# Patient Record
Sex: FEMALE | Race: BLACK OR AFRICAN AMERICAN | NOT HISPANIC OR LATINO | URBAN - METROPOLITAN AREA
[De-identification: names, ages, dates, MRNs, and addresses within clinical notes are randomized per-mention and may not be internally consistent; named-entity substitution may affect disease eponyms.]

---

## 2018-12-06 ENCOUNTER — OUTPATIENT (OUTPATIENT)
Dept: OUTPATIENT SERVICES | Facility: HOSPITAL | Age: 58
LOS: 1 days | End: 2018-12-06
Payer: COMMERCIAL

## 2018-12-06 ENCOUNTER — APPOINTMENT (OUTPATIENT)
Dept: MAMMOGRAPHY | Facility: IMAGING CENTER | Age: 58
End: 2018-12-06
Payer: COMMERCIAL

## 2018-12-06 ENCOUNTER — APPOINTMENT (OUTPATIENT)
Dept: ULTRASOUND IMAGING | Facility: IMAGING CENTER | Age: 58
End: 2018-12-06
Payer: COMMERCIAL

## 2018-12-06 DIAGNOSIS — Z00.00 ENCOUNTER FOR GENERAL ADULT MEDICAL EXAMINATION WITHOUT ABNORMAL FINDINGS: ICD-10-CM

## 2018-12-06 PROCEDURE — 76641 ULTRASOUND BREAST COMPLETE: CPT

## 2018-12-06 PROCEDURE — G0279: CPT | Mod: 26

## 2018-12-06 PROCEDURE — G0279: CPT

## 2018-12-06 PROCEDURE — 76641 ULTRASOUND BREAST COMPLETE: CPT | Mod: 26,50

## 2018-12-06 PROCEDURE — 77066 DX MAMMO INCL CAD BI: CPT | Mod: 26

## 2018-12-06 PROCEDURE — 77066 DX MAMMO INCL CAD BI: CPT

## 2018-12-17 ENCOUNTER — OUTPATIENT (OUTPATIENT)
Dept: OUTPATIENT SERVICES | Facility: HOSPITAL | Age: 58
LOS: 1 days | End: 2018-12-17
Payer: COMMERCIAL

## 2018-12-17 ENCOUNTER — APPOINTMENT (OUTPATIENT)
Dept: ULTRASOUND IMAGING | Facility: IMAGING CENTER | Age: 58
End: 2018-12-17
Payer: COMMERCIAL

## 2018-12-17 ENCOUNTER — RESULT REVIEW (OUTPATIENT)
Age: 58
End: 2018-12-17

## 2018-12-17 DIAGNOSIS — N60.09 SOLITARY CYST OF UNSPECIFIED BREAST: ICD-10-CM

## 2018-12-17 PROCEDURE — 19000 PUNCTURE ASPIR CYST BREAST: CPT | Mod: RT

## 2018-12-17 PROCEDURE — 76942 ECHO GUIDE FOR BIOPSY: CPT | Mod: 26

## 2018-12-17 PROCEDURE — 76942 ECHO GUIDE FOR BIOPSY: CPT

## 2018-12-17 PROCEDURE — 88173 CYTOPATH EVAL FNA REPORT: CPT | Mod: 26

## 2018-12-17 PROCEDURE — 88173 CYTOPATH EVAL FNA REPORT: CPT

## 2018-12-17 PROCEDURE — 19000 PUNCTURE ASPIR CYST BREAST: CPT

## 2018-12-18 LAB — NON-GYNECOLOGICAL CYTOLOGY STUDY: SIGNIFICANT CHANGE UP

## 2019-12-04 NOTE — EMERGENCY DEPARTMENT REPORT
ED Abdominal Pain HPI





- General


Chief Complaint: Abdominal Pain


Stated Complaint: ABD PAIN


Source: patient


Mode of arrival: Ambulatory


Limitations: No Limitations





- History of Present Illness


Initial Comments: 





Patient is a 59-year-old  female with a history of chronic liver

disease who presents to the ED with low back pain and lower abdominal pain for 2

weeks.  Patient states that she has been diagnosed with constipation but has 

never bought any medication prescribed.  Patient denies fever, chills, dysuria, 

urinary frequency and urgency, heavy lifting, dizziness, chest pain, shortness 

of breath, nausea and vomiting or diarrhea fall or traumatic injury, numbness 

and tingling or weakness of lower extremities bilaterally and hematuria.


MD Complaint: abdominal pain, other (lower back pain)


-: Gradual, week(s) (2)


Location: suprapubic


Radiation: suprapubic


Migration to: no migration


Severity: moderate


Severity scale (0 -10): 4


Quality: aching, dull


Consistency: constant


Improves With: nothing


Worsens With: movement


Associated Symptoms: denies other symptoms, constipation.  denies: nausea, 

vomiting, diarrhea, fever, chills, dysuria, hematemesis, hematochezia, melena





- Related Data


                                    Allergies











Allergy/AdvReac Type Severity Reaction Status Date / Time


 


peanut Allergy  Itching Verified 12/04/19 00:57














ED Review of Systems


ROS: 


Stated complaint: ABD PAIN


Other details as noted in HPI





Constitutional: denies: chills, fever


Eyes: denies: eye pain, eye discharge, vision change


ENT: denies: ear pain, throat pain


Respiratory: denies: cough, shortness of breath, wheezing


Cardiovascular: denies: chest pain, palpitations


Endocrine: no symptoms reported


Gastrointestinal: abdominal pain.  denies: nausea, vomiting, diarrhea


Genitourinary: denies: urgency, dysuria, discharge


Musculoskeletal: back pain.  denies: joint swelling, arthralgia


Skin: denies: rash, lesions


Neurological: denies: headache, weakness, paresthesias


Psychiatric: denies: anxiety, depression


Hematological/Lymphatic: denies: easy bleeding, easy bruising





ED Past Medical Hx





- Past Medical History


Previous Medical History?: No





- Surgical History


Past Surgical History?: No





- Social History


Smoking Status: Current Every Day Smoker


Substance Use Type: Alcohol





ED Physical Exam





- General


Limitations: No Limitations


General appearance: alert, in no apparent distress





- Head


Head exam: Present: atraumatic, normocephalic





- Eye


Eye exam: Present: normal appearance, PERRL, EOMI





- ENT


ENT exam: Present: normal exam, normal orophraynx, mucous membranes moist, TM's 

normal bilaterally, normal external ear exam





- Neck


Neck exam: Present: normal inspection, full ROM.  Absent: tenderness





- Respiratory


Respiratory exam: Present: normal lung sounds bilaterally.  Absent: respiratory 

distress, wheezes, rales, chest wall tenderness, accessory muscle use, decreased

breath sounds





- Cardiovascular


Cardiovascular Exam: Present: regular rate, normal rhythm, normal heart sounds. 

Absent: systolic murmur, diastolic murmur, rubs, gallop





- GI/Abdominal


GI/Abdominal exam: Present: soft, normal bowel sounds.  Absent: distended, 

tenderness, guarding, hyperactive bowel sounds, hypoactive bowel sounds, 

organomegaly





- Extremities Exam


Extremities exam: Present: normal inspection, full ROM, normal capillary refill





- Back Exam


Back exam: Present: normal inspection, full ROM.  Absent: tenderness, muscle 

spasm, paraspinal tenderness





- Neurological Exam


Neurological exam: Present: alert, oriented X3, CN II-XII intact, normal gait, 

reflexes normal





- Psychiatric


Psychiatric exam: Present: normal affect, normal mood





- Skin


Skin exam: Present: warm, dry, intact, normal color.  Absent: rash





ED Course





                                   Vital Signs











  12/04/19





  00:58


 


Temperature 97.7 F


 


Pulse Rate 67


 


Respiratory 18





Rate 


 


Blood Pressure 139/72


 


O2 Sat by Pulse 97





Oximetry 














ED Medical Decision Making





- Lab Data


Result diagrams: 


                                 12/04/19 01:26





                                 12/04/19 01:26





- Medical Decision Making





This is a 59-year-old female who presented to the ED with lower back pain and 

lower abdominal pain for 2 weeks.  In the ED, patient is alert and oriented 3 

and is not in distress.  Lab test results were reviewed and on actionable.  

Patient fell asleep in the ED and slept throughout her ED stay no complaining of

any pain just complaining that it was cold.  Patient was discharged home and 

advised to follow-up with her primary care physician in 5-7 days for 

reevaluation or return to the ED immediately if symptoms get worse.





- Differential Diagnosis


abdominal pain; constipation; muscle spasm; UTI


Critical care attestation.: 


If time is entered above; I have spent that time in minutes in the direct care 

of this critically ill patient, excluding procedure time.








ED Disposition


Clinical Impression: 


 Spasm of muscle of lower back, Abdominal pain in female





Disposition: DC-01 TO HOME OR SELFCARE


Is pt being admited?: No


Does the pt Need Aspirin: No


Condition: Stable


Instructions:  Abdominal Pain (ED), Muscle Spasm (ED), Low Back Strain (ED)


Additional Instructions: 


Follow-up with your primary care physician in 5-7 days for reevaluation.  Return

to the ED immediately if symptoms get worse


Referrals: 


PRIMARY CARE,MD [Primary Care Provider] - 3-5 Days


Time of Disposition: 05:16


Print Language: ENGLISH

## 2020-03-26 ENCOUNTER — APPOINTMENT (OUTPATIENT)
Dept: ULTRASOUND IMAGING | Facility: IMAGING CENTER | Age: 60
End: 2020-03-26

## 2020-03-26 ENCOUNTER — APPOINTMENT (OUTPATIENT)
Dept: RADIOLOGY | Facility: IMAGING CENTER | Age: 60
End: 2020-03-26

## 2020-03-26 ENCOUNTER — APPOINTMENT (OUTPATIENT)
Dept: MAMMOGRAPHY | Facility: IMAGING CENTER | Age: 60
End: 2020-03-26

## 2021-01-25 ENCOUNTER — OUTPATIENT (OUTPATIENT)
Dept: OUTPATIENT SERVICES | Facility: HOSPITAL | Age: 61
LOS: 1 days | End: 2021-01-25
Payer: COMMERCIAL

## 2021-01-25 DIAGNOSIS — Z20.828 CONTACT WITH AND (SUSPECTED) EXPOSURE TO OTHER VIRAL COMMUNICABLE DISEASES: ICD-10-CM

## 2021-01-25 PROCEDURE — U0005: CPT

## 2021-01-25 PROCEDURE — U0003: CPT

## 2021-01-26 ENCOUNTER — TRANSCRIPTION ENCOUNTER (OUTPATIENT)
Age: 61
End: 2021-01-26

## 2021-01-26 LAB — SARS-COV-2 RNA SPEC QL NAA+PROBE: SIGNIFICANT CHANGE UP

## 2021-01-26 NOTE — ASU PATIENT PROFILE, ADULT - VISION (WITH CORRECTIVE LENSES IF THE PATIENT USUALLY WEARS THEM):
Right eye distorted/Partially impaired: cannot see medication labels or newsprint, but can see obstacles in path, and the surrounding layout; can count fingers at arm's length

## 2021-01-26 NOTE — ASU PATIENT PROFILE, ADULT - HEALTH/HEALTHCARE ANXIETIES, PROFILE
Check your blood sugar 2 times a day:  Before breakfast and 2 hours after a meal, blood sugar should be less than 150 on average.  Please bring the readings with you to next visit.    Please remember to have an eye exam at least once every year and as needed.    Please remember to have any scheduled blood work a few days prior to your appointment.    FOR TREATMENT OF HYPOGLYCEMIA    · When you feel symptoms like dizziness, blurry vision, cold sweat:    1. Check blood sugar at the time you feel symptoms.    2. Take 15 - 30 grams of carbohydrates (4 - 5 glucose tablets or glucose gel, 8 ounces regular soda, milk or juice, 5 - 6 Lifesavers).    3. Retest glucose in 15 minutes.    4. Repeat above steps if glucose still under 70.    · To prevent hypoglycemia and complications:    1. Always eat when taking your short-acting insulin (Novolog, Humalog, etc.) and don't skip meals if you take oral hypoglycemic medicines (Glyburide, Glipizide, Glimepiride)    2. Check your blood sugar each time before you drive, and don't drive if you are less than 80.    3. Carry a sugar source (glucose tablets/gel, hard candy) with you at all times.    · Always carry medical identification that identifies you as being hypoglycemic.          CARING FOR YOUR FEET    Since you have diabetes you need to take special care of your feet.  Poor circulation, nerve damage, and trouble fighting infections can make foot problems very serious.  These simple guidelines will help prevent complications.    Wash your feet every day in warm - not hot - water and dry thoroughly.  Trim nails straight across with a nail clippers after washing and drying feet. Then, smooth the nail with an emery board.  If you have trouble cutting nails because they are thick or you cannot see them, have nails trimmed by a foot doctor.  Trim corns and calluses gently with a pumice stone.  Never use liquid corn and callus remover.  Do not cut them with a razor blade.  If you get a  wart, do not treat it yourself.  See your doctor.  Always wear hard soled shoe or slippers.  Keep slippers by your bed in case you get up during the night.  Check inside your shoes before putting them on to make certain there is nothing in the shoe.  Wear clean socks every day.  Choose padded socks.  Look for ones with no seams, or smooth flat seams.  Do not use heating pads or hot water bottles on your feet.  Shop for shoes in the evening when your feet are normally a little swollen.  Use a foot cream on your feet after each bath or shower.  Do not put lotion between your toes as this area is already moist.  Prop your feet up when you are sitting.  Don't cross your legs for long periods of time.  Don't smoke.  Smoking contributes to poor circulation.        none

## 2021-01-27 ENCOUNTER — OUTPATIENT (OUTPATIENT)
Dept: OUTPATIENT SERVICES | Facility: HOSPITAL | Age: 61
LOS: 1 days | End: 2021-01-27
Payer: COMMERCIAL

## 2021-01-27 VITALS
SYSTOLIC BLOOD PRESSURE: 133 MMHG | HEART RATE: 85 BPM | OXYGEN SATURATION: 100 % | WEIGHT: 128.31 LBS | TEMPERATURE: 99 F | DIASTOLIC BLOOD PRESSURE: 82 MMHG | RESPIRATION RATE: 20 BRPM | HEIGHT: 62 IN

## 2021-01-27 VITALS
OXYGEN SATURATION: 100 % | DIASTOLIC BLOOD PRESSURE: 75 MMHG | RESPIRATION RATE: 12 BRPM | HEART RATE: 60 BPM | SYSTOLIC BLOOD PRESSURE: 124 MMHG

## 2021-01-27 DIAGNOSIS — Z98.890 OTHER SPECIFIED POSTPROCEDURAL STATES: Chronic | ICD-10-CM

## 2021-01-27 DIAGNOSIS — H33.021 RETINAL DETACHMENT WITH MULTIPLE BREAKS, RIGHT EYE: ICD-10-CM

## 2021-01-27 PROCEDURE — 67108 REPAIR DETACHED RETINA: CPT | Mod: RT

## 2021-01-27 PROCEDURE — C1889: CPT

## 2021-01-27 PROCEDURE — C1784: CPT

## 2021-01-27 NOTE — CONSULT NOTE ADULT - ASSESSMENT
The patient is a 60 year old female with no past medical history who presents for urgent retinal surgery.     Plan:  - ECG with RBBB, but no evidence of ischemia or infarction  - Isolated RBBB does not require further cardiac work-up at this time  - The patient is asymptomatic from a cardiac perspective  - There are no active cardiac issues. The patient is at low risk for cardiac events for a low risk procedure. She is optimized to proceed from a cardiac perspective.

## 2021-01-27 NOTE — ASU PREOP CHECKLIST - BP NONINVASIVE SYSTOLIC (MM HG)
133 Cyclosporine Counseling:  I discussed with the patient the risks of cyclosporine including but not limited to hypertension, gingival hyperplasia,myelosuppression, immunosuppression, liver damage, kidney damage, neurotoxicity, lymphoma, and serious infections. The patient understands that monitoring is required including baseline blood pressure, CBC, CMP, lipid panel and uric acid, and then 1-2 times monthly CMP and blood pressure.

## 2021-01-27 NOTE — CONSULT NOTE ADULT - SUBJECTIVE AND OBJECTIVE BOX
History of Present Illness: The patient is a 60 year old female with no past medical history who presents for urgent retinal surgery. She has no complaints. No recent issues with shortness of breath, chest pain, dizziness, palpitations. Her last procedure was a colonoscopy 5 years ago with no complications. She underwent an ECG and was found to have a RBBB.    Past Medical/Surgical History:  None    Medications:  None    Family History: Non-contributory family history of premature cardiovascular atherosclerotic disease    Social History: No tobacco, alcohol or drug use    Review of Systems:  General: No fevers, chills, weight loss or gain  Skin: No rashes, color changes  Cardiovascular: No chest pain, orthopnea  Respiratory: No shortness of breath, cough  Gastrointestinal: No nausea, abdominal pain  Genitourinary: No incontinence, pain with urination  Musculoskeletal: No pain, swelling, decreased range of motion  Neurological: No headache, weakness  Psychiatric: No depression, anxiety  Endocrine: No weight loss or gain, increased thirst  All other systems are comprehensively negative.    Physical Exam:  Vitals:        Vital Signs Last 24 Hrs  T(C): --  T(F): --  HR: --  BP: --  BP(mean): --  RR: --  SpO2: --  General: NAD  HEENT: MMM  Neck: No JVD, no carotid bruit  Lungs: CTAB  CV: RRR, nl S1/S2, no M/R/G  Abdomen: S/NT/ND, +BS  Extremities: No LE edema, no cyanosis  Neuro: AAOx3, non-focal  Skin: No rash    Labs:                  ECG: NSR, RBBB

## 2021-01-27 NOTE — ASU DISCHARGE PLAN (ADULT/PEDIATRIC) - CARE PROVIDER_API CALL
Hector Haynes  OPHTHALMOLOGY  [address] [place] [address], [address]  [place] [address]  Phone: [phone]  Fax: [phone]  Scheduled Appointment: 01/28/2021 08:00 AM

## 2021-08-18 ENCOUNTER — HOSPITAL ENCOUNTER (EMERGENCY)
Dept: HOSPITAL 5 - ED | Age: 61
Discharge: LEFT BEFORE DISCHARGE | End: 2021-08-18
Payer: COMMERCIAL

## 2021-08-18 DIAGNOSIS — R11.10: Primary | ICD-10-CM

## 2021-08-18 DIAGNOSIS — Z53.21: ICD-10-CM

## 2021-08-18 LAB
ALBUMIN SERPL-MCNC: 5.2 G/DL (ref 3.9–5)
ALT SERPL-CCNC: 18 UNITS/L (ref 7–56)
BASOPHILS # (AUTO): 0.1 K/MM3 (ref 0–0.1)
BASOPHILS NFR BLD AUTO: 0.9 % (ref 0–1.8)
BUN SERPL-MCNC: 12 MG/DL (ref 7–17)
BUN/CREAT SERPL: 24 %
CALCIUM SERPL-MCNC: 9.2 MG/DL (ref 8.4–10.2)
EOSINOPHIL # BLD AUTO: 0.1 K/MM3 (ref 0–0.4)
EOSINOPHIL NFR BLD AUTO: 1.8 % (ref 0–4.3)
HCT VFR BLD CALC: 41.7 % (ref 30.3–42.9)
HEMOLYSIS INDEX: 7
HGB BLD-MCNC: 14.3 GM/DL (ref 10.1–14.3)
LYMPHOCYTES # BLD AUTO: 1.9 K/MM3 (ref 1.2–5.4)
LYMPHOCYTES NFR BLD AUTO: 27.2 % (ref 13.4–35)
MCHC RBC AUTO-ENTMCNC: 34 % (ref 30–34)
MCV RBC AUTO: 92 FL (ref 79–97)
MONOCYTES # (AUTO): 0.3 K/MM3 (ref 0–0.8)
MONOCYTES % (AUTO): 3.7 % (ref 0–7.3)
PLATELET # BLD: 329 K/MM3 (ref 140–440)
RBC # BLD AUTO: 4.55 M/MM3 (ref 3.65–5.03)

## 2021-08-18 PROCEDURE — 80053 COMPREHEN METABOLIC PANEL: CPT

## 2021-08-18 PROCEDURE — 36415 COLL VENOUS BLD VENIPUNCTURE: CPT

## 2021-08-18 PROCEDURE — 85025 COMPLETE CBC W/AUTO DIFF WBC: CPT

## 2021-11-30 PROBLEM — R73.03 PREDIABETES: Chronic | Status: ACTIVE | Noted: 2021-01-27

## 2021-11-30 PROBLEM — I45.10 UNSPECIFIED RIGHT BUNDLE-BRANCH BLOCK: Chronic | Status: ACTIVE | Noted: 2021-01-27

## 2021-11-30 PROBLEM — E78.5 HYPERLIPIDEMIA, UNSPECIFIED: Chronic | Status: ACTIVE | Noted: 2021-01-27

## 2021-12-29 ENCOUNTER — APPOINTMENT (OUTPATIENT)
Dept: ULTRASOUND IMAGING | Facility: IMAGING CENTER | Age: 61
End: 2021-12-29
Payer: COMMERCIAL

## 2021-12-29 ENCOUNTER — APPOINTMENT (OUTPATIENT)
Dept: RADIOLOGY | Facility: IMAGING CENTER | Age: 61
End: 2021-12-29
Payer: COMMERCIAL

## 2021-12-29 ENCOUNTER — APPOINTMENT (OUTPATIENT)
Dept: MAMMOGRAPHY | Facility: IMAGING CENTER | Age: 61
End: 2021-12-29
Payer: COMMERCIAL

## 2021-12-29 ENCOUNTER — OUTPATIENT (OUTPATIENT)
Dept: OUTPATIENT SERVICES | Facility: HOSPITAL | Age: 61
LOS: 1 days | End: 2021-12-29
Payer: COMMERCIAL

## 2021-12-29 DIAGNOSIS — Z00.8 ENCOUNTER FOR OTHER GENERAL EXAMINATION: ICD-10-CM

## 2021-12-29 DIAGNOSIS — Z98.890 OTHER SPECIFIED POSTPROCEDURAL STATES: Chronic | ICD-10-CM

## 2021-12-29 PROCEDURE — 76641 ULTRASOUND BREAST COMPLETE: CPT | Mod: 26,50

## 2021-12-29 PROCEDURE — 77080 DXA BONE DENSITY AXIAL: CPT | Mod: 26

## 2021-12-29 PROCEDURE — 77067 SCR MAMMO BI INCL CAD: CPT | Mod: 26

## 2021-12-29 PROCEDURE — 77063 BREAST TOMOSYNTHESIS BI: CPT

## 2021-12-29 PROCEDURE — 77063 BREAST TOMOSYNTHESIS BI: CPT | Mod: 26

## 2021-12-29 PROCEDURE — 76641 ULTRASOUND BREAST COMPLETE: CPT

## 2021-12-29 PROCEDURE — 77067 SCR MAMMO BI INCL CAD: CPT

## 2021-12-29 PROCEDURE — 77080 DXA BONE DENSITY AXIAL: CPT

## 2022-08-30 ENCOUNTER — HOSPITAL ENCOUNTER (EMERGENCY)
Dept: HOSPITAL 5 - ED | Age: 62
Discharge: HOME | End: 2022-08-30
Payer: SELF-PAY

## 2022-08-30 VITALS — DIASTOLIC BLOOD PRESSURE: 69 MMHG | SYSTOLIC BLOOD PRESSURE: 130 MMHG

## 2022-08-30 DIAGNOSIS — Z72.89: ICD-10-CM

## 2022-08-30 DIAGNOSIS — Z79.899: ICD-10-CM

## 2022-08-30 DIAGNOSIS — N20.0: Primary | ICD-10-CM

## 2022-08-30 DIAGNOSIS — N30.90: ICD-10-CM

## 2022-08-30 DIAGNOSIS — Z91.010: ICD-10-CM

## 2022-08-30 DIAGNOSIS — F17.200: ICD-10-CM

## 2022-08-30 LAB
ALBUMIN SERPL-MCNC: 4.6 G/DL (ref 3.9–5)
ALT SERPL-CCNC: 13 UNITS/L (ref 7–56)
BACTERIA #/AREA URNS HPF: (no result) /HPF
BUN SERPL-MCNC: 10 MG/DL (ref 7–17)
BUN/CREAT SERPL: 17 %
CALCIUM SERPL-MCNC: 9.3 MG/DL (ref 8.4–10.2)
CAOX CRY #/AREA URNS HPF: (no result) /[HPF]
HCT VFR BLD CALC: 40.3 % (ref 30.3–42.9)
HEMOLYSIS INDEX: 10
HGB BLD-MCNC: 14 GM/DL (ref 10.1–14.3)
MCHC RBC AUTO-ENTMCNC: 35 % (ref 30–34)
MCV RBC AUTO: 93 FL (ref 79–97)
MUCOUS THREADS #/AREA URNS HPF: (no result) /HPF
PLATELET # BLD: 236 K/MM3 (ref 140–440)
RBC # BLD AUTO: 4.34 M/MM3 (ref 3.65–5.03)
RBC #/AREA URNS HPF: > 182 /HPF (ref 0–6)
WBC #/AREA URNS HPF: > 182 /HPF (ref 0–6)

## 2022-08-30 PROCEDURE — 99284 EMERGENCY DEPT VISIT MOD MDM: CPT

## 2022-08-30 PROCEDURE — 81001 URINALYSIS AUTO W/SCOPE: CPT

## 2022-08-30 PROCEDURE — 74176 CT ABD & PELVIS W/O CONTRAST: CPT

## 2022-08-30 PROCEDURE — 80053 COMPREHEN METABOLIC PANEL: CPT

## 2022-08-30 PROCEDURE — 96365 THER/PROPH/DIAG IV INF INIT: CPT

## 2022-08-30 PROCEDURE — 85027 COMPLETE CBC AUTOMATED: CPT

## 2022-08-30 PROCEDURE — 96375 TX/PRO/DX INJ NEW DRUG ADDON: CPT

## 2022-08-30 PROCEDURE — 36415 COLL VENOUS BLD VENIPUNCTURE: CPT

## 2022-08-30 NOTE — EMERGENCY DEPARTMENT REPORT
ED Abdominal Pain HPI





- General


Chief Complaint: Urogenital-Female


Stated Complaint: DIFFICULTY URINATING


PUI?: No


Time Seen by Provider: 08/30/22 08:24


Source: patient, EMS


Mode of arrival: Stretcher


Limitations: No Limitations





- History of Present Illness


Initial Comments: 





61 yo comes to the ER with "difficulty urinating." No burning or frequency but 

difficult to start stream. no abd pain or back pain. no fever or chills. am

bulatory and nad in ER. 


Migration to: no migration


Severity: moderate


Severity scale (0 -10): 3


Quality: aching


Consistency: constant


Improves With: nothing


Worsens With: eating


Associated Symptoms: denies other symptoms.  denies: nausea, vomiting, diarrhea,

fever, chills, constipation, dysuria, hematemesis, hematochezia, melena, 

hematuria, anorexia, syncope





- Related Data


                                  Previous Rx's











 Medication  Instructions  Recorded  Last Taken  Type


 


Ondansetron [Zofran Odt] 4 mg PO Q8HR PRN #10 tab.rapdis 08/30/22 Unknown Rx


 


Sulfamethoxazole/Trimethoprim 1 each PO BID #10 tablet 08/30/22 Unknown Rx





[Bactrim DS TAB]    


 


Tamsulosin [Flomax] 0.4 mg PO QDAY #10 cap 08/30/22 Unknown Rx











                                    Allergies











Allergy/AdvReac Type Severity Reaction Status Date / Time


 


peanut Allergy  Itching Verified 12/04/19 00:57














ED Review of Systems


ROS: 


Stated complaint: DIFFICULTY URINATING


Other details as noted in HPI





Comment: All other systems reviewed and negative





ED Past Medical Hx





- Past Medical History


Previous Medical History?: No





- Surgical History


Past Surgical History?: No





- Family History


Family history: no significant





- Social History


Smoking Status: Current Every Day Smoker


Substance Use Type: Alcohol





- Medications


Home Medications: 


                                Home Medications











 Medication  Instructions  Recorded  Confirmed  Last Taken  Type


 


Ondansetron [Zofran Odt] 4 mg PO Q8HR PRN #10 tab.rapdis 08/30/22  Unknown Rx


 


Sulfamethoxazole/Trimethoprim 1 each PO BID #10 tablet 08/30/22  Unknown Rx





[Bactrim DS TAB]     


 


Tamsulosin [Flomax] 0.4 mg PO QDAY #10 cap 08/30/22  Unknown Rx














ED Physical Exam





- General


Limitations: No Limitations


General appearance: alert, in no apparent distress





- Head


Head exam: Present: atraumatic, normocephalic





- Eye


Eye exam: Present: normal appearance





- ENT


ENT exam: Present: mucous membranes moist





- Neck


Neck exam: Present: normal inspection





- Respiratory


Respiratory exam: Present: normal lung sounds bilaterally.  Absent: respiratory 

distress





- Cardiovascular


Cardiovascular Exam: Present: regular rate, normal rhythm.  Absent: systolic 

murmur, diastolic murmur, rubs, gallop





- GI/Abdominal


GI/Abdominal exam: Present: soft, normal bowel sounds





- Extremities Exam


Extremities exam: Present: normal inspection





- Back Exam


Back exam: Present: normal inspection





- Neurological Exam


Neurological exam: Present: alert, oriented X3





- Psychiatric


Psychiatric exam: Present: normal affect, normal mood





- Skin


Skin exam: Present: warm, dry, intact, normal color.  Absent: rash





ED Course


                                   Vital Signs











  08/30/22 08/30/22





  02:04 15:34


 


Temperature 98 F 


 


Pulse Rate 68 70


 


Respiratory 16 18





Rate  


 


Blood Pressure 134/72 130/69





[Right]  


 


O2 Sat by Pulse 99 99





Oximetry  














ED Medical Decision Making





- Lab Data


Result diagrams: 


                                 08/30/22 08:29





                                 08/30/22 08:29





- Radiology Data


Radiology results: report reviewed, image reviewed





see report





- Medical Decision Making








Labs











  08/30/22 08/30/22 08/30/22





  08:29 08:29 09:05


 


WBC  4.9  


 


RBC  4.34  


 


Hgb  14.0  


 


Hct  40.3  


 


MCV  93  


 


MCH  32  


 


MCHC  35 H  


 


RDW  14.4  


 


Plt Count  236  


 


Sodium   140 


 


Potassium   4.4 


 


Chloride   102.4 


 


Carbon Dioxide   30 


 


Anion Gap   12 


 


BUN   10 


 


Creatinine   0.6 


 


Estimated GFR   > 60 


 


BUN/Creatinine Ratio   17 


 


Glucose   93 


 


Calcium   9.3 


 


Total Bilirubin   0.60 


 


AST   19 


 


ALT   13 


 


Alkaline Phosphatase   49 


 


Total Protein   6.5 


 


Albumin   4.6 


 


Albumin/Globulin Ratio   2.4 


 


Urine Color    Straw


 


Urine Turbidity    Cloudy


 


Specific Gravity (Man)    1.020


 


Ur Protein (Man)    3+


 


Ur Ketones (Man)    Negative


 


Ur Nitrite (Man)    Negative


 


Ur Reducing Substances    Not Reportable


 


Urine Bilirubin (Man)    Negative


 


Urine Ictotest    Not Reportable


 


Leukocyte Esterase (Man)    2+


 


Urine WBC (Auto)    > 182.0 H


 


Urine RBC (Auto)    > 182.0


 


U Epithel Cells (Auto)    32.0 H


 


Urine Bacteria (Auto)    3+


 


Urine RBC (Manual)    3+


 


Urine WBC Clumps    3+


 


Calcium Oxalate Crystal    3+


 


Urine Mucus    Few


 


Ur Yeast w Hyphae    3+











                                   Vital Signs











  08/30/22





  02:04


 


Temperature 98 F


 


Pulse Rate 68


 


Respiratory 16





Rate 


 


Blood Pressure 134/72





[Right] 


 


O2 Sat by Pulse 99





Oximetry 








labs noted





ua noted





ct noted





1L NS/toradol/zofran/rocephin in ER





educated on findings of todays studies





dc home with dc plan of care including diet, meds, activity and follow up. on 

d/c pt ambulatory and taking pop





- Differential Diagnosis


ro uti/stone


Critical care attestation.: 


If time is entered above; I have spent that time in minutes in the direct care 

of this critically ill patient, excluding procedure time.








ED Disposition


Clinical Impression: 


 Kidney stone, Cystitis





Disposition: 01 HOME / SELF CARE / HOMELESS


Is pt being admited?: No


Does the pt Need Aspirin: No


Condition: Stable


Instructions:  Kidney Stones, Easy-to-Read


Additional Instructions: 


STAY WELL HYDRATED WITH WATER





MEDS AS ORDERED TODAY





FOLLOW UP WITH UROLOGY


REFERRAL BELOW





MOTRIN OR TYLENOL FOR PAIN








Prescriptions: 


Sulfamethoxazole/Trimethoprim [Bactrim DS TAB] 1 each PO BID #10 tablet


Tamsulosin [Flomax] 0.4 mg PO QDAY #10 cap


Ondansetron [Zofran Odt] 4 mg PO Q8HR PRN #10 tab.rapdis


 PRN Reason: Vomiting


Referrals: 


VICKIE RANDLE MD [Staff Physician] - 3-5 Days


FLOR GARCIA MD [Primary Care Provider] - 3-5 Days


Time of Disposition: 14:13

## 2022-08-30 NOTE — CAT SCAN REPORT
CT ABDOMEN AND PELVIS WITHOUT CONTRAST



HISTORY: PAIN RO K STONE



COMPARISON: None.



TECHNIQUE: Axial CT images were obtained through the abdomen and pelvis without IV contrast. Sagittal
 and coronal reformatted images. All CT scans at this location are performed using CT dose reduction 
for ALARA by means of automated exposure control.





FINDINGS:



CT ABDOMEN:

Lung Bases: Clear.

Liver: The liver is mildly enlarged but no parenchymal disease or mass is appreciated.

Biliary: No significant abnormality.

Spleen: No significant abnormality.  Unenlarged.

Pancreas: No significant abnormality.

Adrenals: No significant abnormality.

Kidneys: There are 2 calyceal stones in the superior left kidney measuring 3 mm and 2 mm. No right ne
phrolithiasis. No focal renal lesion. No ureteral stone or hydronephrosis.

Lymphatics: No lymphadenopathy.

Vasculature: Mild atherosclerotic disease without acute abnormality. 

Bowel/Peritoneum: No significant abnormality. No free air. No free fluid. Normal appendix.



CT PELVIS:

: The bladder is poorly distended but there is suggestion of mild diffuse bladder wall thickening m
easuring up to 9 mm. Cystitis could be considered. There is a 1.7 cm right ovarian cyst. The uterus a
nd left ovary are unremarkable.



Osseous Structures: Nothing acute. Grade 1 anterolisthesis of L4 with respect to L5 is noted.

Additional Findings: None



IMPRESSION:

Nonobstructing left nephrolithiasis. No ureteral stones or hydronephrosis.

1.7 cm right ovarian cyst.

The bladder is poorly distended but there is suggestion of diffuse bladder wall thickening, correlate
 for cystitis.

Mild hepatomegaly.



Signer Name: Ede Dumas Jr, MD 

Signed: 8/30/2022 1:12 PM

Workstation Name: [id number]

## 2022-09-17 ENCOUNTER — HOSPITAL ENCOUNTER (EMERGENCY)
Dept: HOSPITAL 5 - ED | Age: 62
Discharge: HOME | End: 2022-09-17
Payer: SELF-PAY

## 2022-09-17 VITALS — SYSTOLIC BLOOD PRESSURE: 132 MMHG | DIASTOLIC BLOOD PRESSURE: 78 MMHG

## 2022-09-17 DIAGNOSIS — Z79.899: ICD-10-CM

## 2022-09-17 DIAGNOSIS — Z91.010: ICD-10-CM

## 2022-09-17 DIAGNOSIS — N83.201: Primary | ICD-10-CM

## 2022-09-17 DIAGNOSIS — Z98.890: ICD-10-CM

## 2022-09-17 LAB
ALBUMIN SERPL-MCNC: 5.4 G/DL (ref 3.9–5)
ALT SERPL-CCNC: 15 UNITS/L (ref 7–56)
BASOPHILS # (AUTO): 0.1 K/MM3 (ref 0–0.1)
BASOPHILS NFR BLD AUTO: 1.4 % (ref 0–1.8)
BUN SERPL-MCNC: 9 MG/DL (ref 7–17)
BUN/CREAT SERPL: 18 %
CALCIUM SERPL-MCNC: 9.9 MG/DL (ref 8.4–10.2)
EOSINOPHIL # BLD AUTO: 0.2 K/MM3 (ref 0–0.4)
EOSINOPHIL NFR BLD AUTO: 5.3 % (ref 0–4.3)
HCT VFR BLD CALC: 43.4 % (ref 30.3–42.9)
HEMOLYSIS INDEX: 5
HGB BLD-MCNC: 14.7 GM/DL (ref 10.1–14.3)
HYALINE CASTS #/AREA URNS LPF: 1 /LPF
LYMPHOCYTES # BLD AUTO: 1.6 K/MM3 (ref 1.2–5.4)
LYMPHOCYTES NFR BLD AUTO: 36.1 % (ref 13.4–35)
MCHC RBC AUTO-ENTMCNC: 34 % (ref 30–34)
MCV RBC AUTO: 92 FL (ref 79–97)
MONOCYTES # (AUTO): 0.3 K/MM3 (ref 0–0.8)
MONOCYTES % (AUTO): 6.1 % (ref 0–7.3)
MUCOUS THREADS #/AREA URNS HPF: (no result) /HPF
PLATELET # BLD: 323 K/MM3 (ref 140–440)
RBC # BLD AUTO: 4.69 M/MM3 (ref 3.65–5.03)
RBC #/AREA URNS HPF: 3 /HPF (ref 0–6)
WBC #/AREA URNS HPF: < 1 /HPF (ref 0–6)

## 2022-09-17 PROCEDURE — 96374 THER/PROPH/DIAG INJ IV PUSH: CPT

## 2022-09-17 PROCEDURE — 81001 URINALYSIS AUTO W/SCOPE: CPT

## 2022-09-17 PROCEDURE — 80307 DRUG TEST PRSMV CHEM ANLYZR: CPT

## 2022-09-17 PROCEDURE — 96361 HYDRATE IV INFUSION ADD-ON: CPT

## 2022-09-17 PROCEDURE — 96375 TX/PRO/DX INJ NEW DRUG ADDON: CPT

## 2022-09-17 PROCEDURE — 99284 EMERGENCY DEPT VISIT MOD MDM: CPT

## 2022-09-17 PROCEDURE — 85025 COMPLETE CBC W/AUTO DIFF WBC: CPT

## 2022-09-17 PROCEDURE — 80053 COMPREHEN METABOLIC PANEL: CPT

## 2022-09-17 PROCEDURE — 82962 GLUCOSE BLOOD TEST: CPT

## 2022-09-17 PROCEDURE — 83690 ASSAY OF LIPASE: CPT

## 2022-09-17 PROCEDURE — 82150 ASSAY OF AMYLASE: CPT

## 2022-09-17 PROCEDURE — 36415 COLL VENOUS BLD VENIPUNCTURE: CPT

## 2022-09-17 NOTE — EMERGENCY DEPARTMENT REPORT
ED Abdominal Pain HPI





- General


Chief Complaint: Abdominal Pain


Stated Complaint: GENERAL SICKNESS


PUI?: No


Time Seen by Provider: 09/17/22 08:35


Source: patient, EMS


Mode of arrival: Stretcher


Limitations: No Limitations





- History of Present Illness


Initial Comments: 





Reporting right flank pain for several hours. Denies nausea nor vomiting.


MD Complaint: abdominal pain


Location: R flank


Migration to: no migration


Severity scale (0 -10): 2


Consistency: constant


Improves With: nothing





- Related Data


                                  Previous Rx's











 Medication  Instructions  Recorded  Last Taken  Type


 


Ondansetron [Zofran Odt] 4 mg PO Q8HR PRN #10 tab.rapdis 08/30/22 Unknown Rx


 


Sulfamethoxazole/Trimethoprim 1 each PO BID #10 tablet 08/30/22 Unknown Rx





[Bactrim DS TAB]    


 


Tamsulosin [Flomax] 0.4 mg PO QDAY #10 cap 08/30/22 Unknown Rx











                                    Allergies











Allergy/AdvReac Type Severity Reaction Status Date / Time


 


peanut Allergy  Itching Verified 12/04/19 00:57














ED Review of Systems


ROS: 


Stated complaint: GENERAL SICKNESS


Other details as noted in HPI





Constitutional: denies: chills, fever


Eyes: denies: eye pain, eye discharge, vision change


ENT: denies: ear pain, throat pain


Respiratory: denies: cough, shortness of breath, wheezing


Cardiovascular: denies: chest pain, palpitations


Endocrine: no symptoms reported


Gastrointestinal: denies: abdominal pain, nausea, diarrhea


Genitourinary: denies: urgency, dysuria, discharge


Musculoskeletal: denies: back pain, joint swelling, arthralgia


Skin: denies: rash, lesions


Neurological: denies: headache, weakness, paresthesias


Psychiatric: denies: anxiety, depression


Hematological/Lymphatic: denies: easy bleeding, easy bruising





ED Past Medical Hx





- Past Medical History


Previous Medical History?: Yes


Hx Hypertension: No


Hx CVA: No


Hx Kidney Stones: Yes





- Surgical History


Past Surgical History?: No





- Social History


Smoking Status: Never Smoker


Substance Use Type: None





- Medications


Home Medications: 


                                Home Medications











 Medication  Instructions  Recorded  Confirmed  Last Taken  Type


 


Ondansetron [Zofran Odt] 4 mg PO Q8HR PRN #10 tab.rapdis 08/30/22  Unknown Rx


 


Sulfamethoxazole/Trimethoprim 1 each PO BID #10 tablet 08/30/22  Unknown Rx





[Bactrim DS TAB]     


 


Tamsulosin [Flomax] 0.4 mg PO QDAY #10 cap 08/30/22  Unknown Rx














ED Physical Exam





- General


Limitations: No Limitations


General appearance: alert, in no apparent distress





- Head


Head exam: Present: atraumatic, normocephalic





- Eye


Eye exam: Present: normal appearance





- ENT


ENT exam: Present: mucous membranes moist





- Neck


Neck exam: Present: normal inspection





- Respiratory


Respiratory exam: Present: normal lung sounds bilaterally.  Absent: respiratory 

distress





- Cardiovascular


Cardiovascular Exam: Present: regular rate, normal rhythm.  Absent: systolic 

murmur, diastolic murmur, rubs, gallop





- GI/Abdominal


GI/Abdominal exam: Present: soft, normal bowel sounds





- Extremities Exam


Extremities exam: Present: normal inspection





- Back Exam


Back exam: Present: normal inspection





- Neurological Exam


Neurological exam: Present: alert, oriented X3





- Psychiatric


Psychiatric exam: Present: normal affect, normal mood





- Skin


Skin exam: Present: warm, dry, intact, normal color.  Absent: rash





ED Course





                                   Vital Signs











  09/17/22 09/17/22 09/17/22





  06:26 08:37 11:16


 


Temperature 98.2 F 98.1 F 


 


Pulse Rate 90 71 76


 


Respiratory 18 18 20





Rate   


 


Blood Pressure 124/84  


 


Blood Pressure  131/74 132/78





[Left]   


 


O2 Sat by Pulse 100 100 97





Oximetry   














ED Medical Decision Making





- Lab Data


Result diagrams: 


                                 09/17/22 08:19





                                 09/17/22 08:19





- Radiology Data


Radiology results: report reviewed, image reviewed





- Medical Decision Making





work up negative , UA clear recent CT scan shwoed right ovarian cyst 


Critical care attestation.: 


If time is entered above; I have spent that time in minutes in the direct care 

of this critically ill patient, excluding procedure time.








ED Disposition


Clinical Impression: 


 Ovarian cyst





Disposition: 01 HOME / SELF CARE / HOMELESS


Is pt being admited?: No


Does the pt Need Aspirin: No


Condition: Stable


Instructions:  Abdominal Pain (ED), Ovarian Cyst, Easy-to-Read


Referrals: 


PRIMARY CARE,MD [Primary Care Provider] - 3-5 Days


GUNNAR RAGLAND MD [Staff Physician] - 3-5 Days

## 2023-03-25 ENCOUNTER — APPOINTMENT (OUTPATIENT)
Dept: CT IMAGING | Facility: IMAGING CENTER | Age: 63
End: 2023-03-25

## 2023-04-15 ENCOUNTER — OUTPATIENT (OUTPATIENT)
Dept: OUTPATIENT SERVICES | Facility: HOSPITAL | Age: 63
LOS: 1 days | End: 2023-04-15
Payer: COMMERCIAL

## 2023-04-15 ENCOUNTER — APPOINTMENT (OUTPATIENT)
Dept: CT IMAGING | Facility: IMAGING CENTER | Age: 63
End: 2023-04-15
Payer: COMMERCIAL

## 2023-04-15 DIAGNOSIS — Z98.890 OTHER SPECIFIED POSTPROCEDURAL STATES: Chronic | ICD-10-CM

## 2023-04-15 DIAGNOSIS — R10.9 UNSPECIFIED ABDOMINAL PAIN: ICD-10-CM

## 2023-04-15 PROCEDURE — 74176 CT ABD & PELVIS W/O CONTRAST: CPT

## 2023-04-15 PROCEDURE — 74176 CT ABD & PELVIS W/O CONTRAST: CPT | Mod: 26

## 2023-05-12 ENCOUNTER — APPOINTMENT (OUTPATIENT)
Dept: ULTRASOUND IMAGING | Facility: IMAGING CENTER | Age: 63
End: 2023-05-12

## 2023-05-12 ENCOUNTER — APPOINTMENT (OUTPATIENT)
Dept: MAMMOGRAPHY | Facility: IMAGING CENTER | Age: 63
End: 2023-05-12